# Patient Record
Sex: FEMALE | NOT HISPANIC OR LATINO | URBAN - METROPOLITAN AREA
[De-identification: names, ages, dates, MRNs, and addresses within clinical notes are randomized per-mention and may not be internally consistent; named-entity substitution may affect disease eponyms.]

---

## 2017-03-20 ENCOUNTER — IMPORTED ENCOUNTER (OUTPATIENT)
Dept: URBAN - METROPOLITAN AREA CLINIC 1 | Facility: CLINIC | Age: 67
End: 2017-03-20

## 2017-03-20 PROBLEM — H02.831: Noted: 2017-03-20

## 2017-03-20 PROBLEM — H02.834: Noted: 2017-03-20

## 2017-03-20 PROBLEM — H16.143: Noted: 2017-03-20

## 2017-03-20 PROBLEM — Z79.84: Noted: 2017-03-20

## 2017-03-20 PROBLEM — H25.813: Noted: 2017-03-20

## 2017-03-20 PROBLEM — D23.11: Noted: 2017-03-20

## 2017-03-20 PROBLEM — E11.9: Noted: 2017-03-20

## 2017-03-20 PROBLEM — H35.033: Noted: 2017-03-20

## 2017-03-20 PROBLEM — H04.123: Noted: 2017-03-20

## 2017-03-20 PROCEDURE — 92014 COMPRE OPH EXAM EST PT 1/>: CPT

## 2017-03-20 PROCEDURE — 92015 DETERMINE REFRACTIVE STATE: CPT

## 2017-03-20 NOTE — PATIENT DISCUSSION
1.  DM Type II (Oral Meds) without sign of diabetic retinopathy and no blot heme on dilated retinal examination today OU No Macular Edema:  Discussed the pathophysiology of diabetes and its effect on the eye and risk of blindness. Stressed the importance of strong glucose control. Advised of importance of at least yearly dilated examinations but to contact us immediately for any problems or concerns. 2. MARITA w/ increased PEK OU- Increase ATs to TID OU Routinely. Plugs w/o improvement. 3.  Cataract OU:  Visually Significant secondary to glare discussed the risks benefits alternatives and limitations of cataract surgery. The patient stated a full understanding and a desire to proceed with the procedure. The patient will need to return for preop appointment with cataract measurements and to have any additional questions answered and start pre-operative eye drops as directed. Not MF Candidate due to amount of astigmatism. Discuss Toric vs. Standard. Cat Pack gvien Phaco PCL OS then OD. (Otherwise f/u 6 months 10 glare) 4. Dermatochalasis OU UL's  - Observe 5. Hypertensive Retinopathy OU- Stable continue HTN Control6. Papilloma RUL- appears benign. Discussed with patient insurance would not cover procedure. 7.  Cyst R Lateral Canthus- appears benign. Letter to PCP Return for an appointment with Dr. Pamella Mai for AS/HP.

## 2017-03-31 ENCOUNTER — IMPORTED ENCOUNTER (OUTPATIENT)
Dept: URBAN - METROPOLITAN AREA CLINIC 1 | Facility: CLINIC | Age: 67
End: 2017-03-31

## 2017-03-31 PROBLEM — H25.813: Noted: 2017-03-31

## 2017-03-31 PROCEDURE — 92136 OPHTHALMIC BIOMETRY: CPT

## 2017-03-31 NOTE — PATIENT DISCUSSION
1. Cataract OU:  Visually Significant secondary to glare discussed the risks benefits alternatives and limitations of cataract surgery. The patient stated a full understanding and a desire to proceed with the procedure. Advised patient to continue po ASA; also to skip PM dose and AM dose of Metformin the day of and night before surgery. Pt understands they will need glasses post-op to achieve their best vision at near. Educated patient regarding option of LenSx upgrade; Video shown regarding LenSx. Pt defers LenSx. Phaco PCL OS then OD. Toric lens standard technique.

## 2017-04-12 ENCOUNTER — IMPORTED ENCOUNTER (OUTPATIENT)
Dept: URBAN - METROPOLITAN AREA CLINIC 1 | Facility: CLINIC | Age: 67
End: 2017-04-12

## 2017-04-13 ENCOUNTER — IMPORTED ENCOUNTER (OUTPATIENT)
Dept: URBAN - METROPOLITAN AREA CLINIC 1 | Facility: CLINIC | Age: 67
End: 2017-04-13

## 2017-04-13 PROBLEM — Z96.1: Noted: 2017-04-13

## 2017-04-13 PROCEDURE — 99024 POSTOP FOLLOW-UP VISIT: CPT

## 2017-04-13 NOTE — PATIENT DISCUSSION
POD#1 CE/IOL OS (Toric) doing well. Continue all 3 gtts as prescribed and until gone. Ocuflox TIDUse Durezol BID OS Ilevro Qdaily OS Ocuflox TID OS Post op Warnings Reiterated RTC as scheduled

## 2017-04-18 ENCOUNTER — IMPORTED ENCOUNTER (OUTPATIENT)
Dept: URBAN - METROPOLITAN AREA CLINIC 1 | Facility: CLINIC | Age: 67
End: 2017-04-18

## 2017-04-18 PROBLEM — Z96.1: Noted: 2017-04-18

## 2017-04-18 PROBLEM — H25.811: Noted: 2017-04-18

## 2017-04-18 PROCEDURE — 92136 OPHTHALMIC BIOMETRY: CPT

## 2017-04-18 NOTE — PATIENT DISCUSSION
1.  Cataract OD: Visually Significant secondary to glare discussed the risks benefits alternatives and limitations of cataract surgery. The patient stated a full understanding and a desire to proceed with the procedure. The patient will need to start pre-operative eye drops as directed. Proceed w/ phaco PCL OD2. POW#1  CE/IOL Toric OS doing well. Discontinue OcufloxContinue Lotemax/Durezol/Prednisolone BID until gone. Continue Prolensa/Ilevro/Acular QD until gone. 3. Return for an appointment for sx Od as scheduled with Dr. Clarence Tapia.

## 2017-04-26 ENCOUNTER — IMPORTED ENCOUNTER (OUTPATIENT)
Dept: URBAN - METROPOLITAN AREA CLINIC 1 | Facility: CLINIC | Age: 67
End: 2017-04-26

## 2017-04-27 ENCOUNTER — IMPORTED ENCOUNTER (OUTPATIENT)
Dept: URBAN - METROPOLITAN AREA CLINIC 1 | Facility: CLINIC | Age: 67
End: 2017-04-27

## 2017-04-27 PROBLEM — Z96.1: Noted: 2017-04-27

## 2017-04-27 PROCEDURE — 99024 POSTOP FOLLOW-UP VISIT: CPT

## 2017-04-27 NOTE — PATIENT DISCUSSION
1. POD#1 CE/IOL Toric OD doing well. Continue all 3 gtts as prescribed and until gone. Ocuflox TIDDurezol BIDIlevro QDPost op Warnings Reiterated 2. POW#2  CE/IOL Toric OS doing well. Continue Durezol BID until gone. Continue Ilevro QD until gone. 3. Return for an appointment for [address] as scheduled with Dr. Roma Ahuja.

## 2017-05-18 ENCOUNTER — IMPORTED ENCOUNTER (OUTPATIENT)
Dept: URBAN - METROPOLITAN AREA CLINIC 1 | Facility: CLINIC | Age: 67
End: 2017-05-18

## 2017-05-18 PROBLEM — Z96.1: Noted: 2017-05-18

## 2017-05-18 PROCEDURE — 99024 POSTOP FOLLOW-UP VISIT: CPT

## 2017-05-18 NOTE — PATIENT DISCUSSION
1. POW#3 Phaco/ PCL OU (Toric OU) doing well Finish PO meds per schedule MRX for glasses optional  2. Benign Floater OS- RD precautions. Return for an appointment in Feb 30 with Dr. Marley Lam.

## 2018-06-25 ENCOUNTER — IMPORTED ENCOUNTER (OUTPATIENT)
Dept: URBAN - METROPOLITAN AREA CLINIC 1 | Facility: CLINIC | Age: 68
End: 2018-06-25

## 2018-06-25 PROBLEM — Z96.1: Noted: 2018-06-25

## 2018-06-25 PROBLEM — H02.831: Noted: 2018-06-25

## 2018-06-25 PROBLEM — Z79.84: Noted: 2018-06-25

## 2018-06-25 PROBLEM — H16.143: Noted: 2018-06-25

## 2018-06-25 PROBLEM — H43.812: Noted: 2018-06-25

## 2018-06-25 PROBLEM — H02.834: Noted: 2018-06-25

## 2018-06-25 PROBLEM — E11.9: Noted: 2018-06-25

## 2018-06-25 PROBLEM — H35.033: Noted: 2018-06-25

## 2018-06-25 PROBLEM — H04.123: Noted: 2018-06-25

## 2018-06-25 PROCEDURE — 92014 COMPRE OPH EXAM EST PT 1/>: CPT

## 2018-06-25 NOTE — PATIENT DISCUSSION
1.  DM Type II (Oral Medication) without sign of diabetic retinopathy and no blot heme on dilated retinal examination today OU No Macular Edema:  Discussed the pathophysiology of diabetes and its effect on the eye and risk of blindness. Stressed the importance of strong glucose control. Advised of importance of at least yearly dilated examinations but to contact us immediately for any problems or concerns. 2. MARITA w/ PEK OU- Recommend cont ATs TID OU routinely 3. GR I Hypertensive Retinopathy OU- Stable continue HTN Control4. Pseudophakia OU - (Toric OU) 5.  Dermatochalasis OU UL's  - Follow with no intervention at this time. 6. PVD w/o Tear OS- RD precautions. 7.  Papilloma RUL- appears benign. Discussed with patient insurance would not cover procedure. 8.  Cyst R Lateral Canthus- appears benign. MRX for glasses givenReturn for an appointment in 1 year 30/glare with Dr. Clarence Tapia.

## 2019-06-04 ENCOUNTER — IMPORTED ENCOUNTER (OUTPATIENT)
Dept: URBAN - METROPOLITAN AREA CLINIC 1 | Facility: CLINIC | Age: 69
End: 2019-06-04

## 2019-06-04 PROBLEM — Z96.1: Noted: 2019-06-04

## 2019-06-04 PROBLEM — H04.123: Noted: 2019-06-04

## 2019-06-04 PROBLEM — Z79.84: Noted: 2019-06-04

## 2019-06-04 PROBLEM — H16.143: Noted: 2019-06-04

## 2019-06-04 PROBLEM — H26.493: Noted: 2019-06-04

## 2019-06-04 PROBLEM — H02.834: Noted: 2019-06-04

## 2019-06-04 PROBLEM — H35.033: Noted: 2019-06-04

## 2019-06-04 PROBLEM — H43.812: Noted: 2019-06-04

## 2019-06-04 PROBLEM — E11.9: Noted: 2019-06-04

## 2019-06-04 PROBLEM — H02.831: Noted: 2019-06-04

## 2019-06-04 PROCEDURE — 92014 COMPRE OPH EXAM EST PT 1/>: CPT

## 2019-06-04 NOTE — PATIENT DISCUSSION
1.  DM Type II (Oral Medication) without sign of diabetic retinopathy and no blot heme on dilated retinal examination today OU No Macular Edema: (32 years). Discussed the pathophysiology of diabetes and its effect on the eye and risk of blindness. Stressed the importance of strong glucose control. Advised of importance of at least yearly dilated examinations but to contact us immediately for any problems or concerns. 2. MARITA w/ PEK OU- Recommend ATs TID OU routinely  3. PCO OU: (Posterior Capsule Opacification)   Observe and consider yag cap when pt feels pco visually significant and visual acuity decreases to appropriate level. 4. Pseudophakia OU - (Toric OU) 5.  Dermatochalasis OU UL's  - Follow with no intervention at this time. 6. GR I Hypertensive Retinopathy OU- Stable continue HTN Control7. PVD w/o Tear OS- RD precautions. 8.  Papilloma RUL- appears benign. Discussed with patient insurance would not cover procedure. 9.  Cyst R Lateral Canthus- appears benign. Patient deferred Manifest Rx today. Return for an appointment in 1 year 30/glare with Dr. Marley Lam.

## 2020-06-04 ENCOUNTER — IMPORTED ENCOUNTER (OUTPATIENT)
Dept: URBAN - METROPOLITAN AREA CLINIC 1 | Facility: CLINIC | Age: 70
End: 2020-06-04

## 2020-06-04 PROBLEM — H04.123: Noted: 2020-06-04

## 2020-06-04 PROBLEM — H02.834: Noted: 2020-06-04

## 2020-06-04 PROBLEM — H16.143: Noted: 2020-06-04

## 2020-06-04 PROBLEM — H02.831: Noted: 2020-06-04

## 2020-06-04 PROBLEM — E11.9: Noted: 2020-06-04

## 2020-06-04 PROBLEM — H35.033: Noted: 2020-06-04

## 2020-06-04 PROBLEM — Z79.84: Noted: 2020-06-04

## 2020-06-04 PROBLEM — H26.493: Noted: 2020-06-04

## 2020-06-04 PROBLEM — H43.812: Noted: 2020-06-04

## 2020-06-04 PROBLEM — Z96.1: Noted: 2020-06-04

## 2020-06-04 PROCEDURE — 92014 COMPRE OPH EXAM EST PT 1/>: CPT

## 2020-06-04 NOTE — PATIENT DISCUSSION
1.  DM Type II (Oral Medication) without sign of diabetic retinopathy and no blot heme on dilated retinal examination today OU No Macular Edema:  Discussed the pathophysiology of diabetes and its effect on the eye and risk of blindness. Stressed the importance of strong glucose control. Advised of importance of at least yearly dilated examinations but to contact us immediately for any problems or concerns. 2. MARITA w/ PEK OU- Recommend ATs QID OU routinely and Refresh Celluvisc QHS OU. Consider Restasis vs Xiidra without improvement3. PCO OU: (Posterior Capsule Opacification)   Observe and consider yag cap when pt feels pco visually significant and visual acuity decreases to appropriate level. 4. Pseudophakia OU - (Toric OU) 5.  Dermatochalasis OU UL's  - Follow with no intervention at this time. 6. GR I Hypertensive Retinopathy OU- Stable continue HTN Control7. PVD w/o Tear OS- RD precautions. 8.  Papilloma RUL- appears benign. Discussed with patient insurance would not cover procedure. 9.  Cyst R Lateral Canthus- appears benign. Patient deferred Manifest Rx today. Return for an appointment in 4 months 10 (check ks consider Restasis vs Kyleigh Ackerman) with Dr. Lu Russ.

## 2020-10-06 ENCOUNTER — IMPORTED ENCOUNTER (OUTPATIENT)
Dept: URBAN - METROPOLITAN AREA CLINIC 1 | Facility: CLINIC | Age: 70
End: 2020-10-06

## 2020-10-06 PROBLEM — H16.143: Noted: 2020-10-06

## 2020-10-06 PROBLEM — H04.123: Noted: 2020-10-06

## 2020-10-06 PROCEDURE — 99213 OFFICE O/P EST LOW 20 MIN: CPT

## 2020-10-06 NOTE — PATIENT DISCUSSION
PCO OU: (Posterior Capsule Opacification)   Observe and consider yag cap when pt feels pco visually significant and visual acuity decreases to appropriate level. 3. Pseudophakia OU - (Toric OU) 4.  Dermatochalasis OU UL's  - Follow with no intervention at this time. 5. Papilloma RUL- appears benign. Discussed with patient insurance would not cover procedure. 6.  Cyst R Lateral Canthus- appears benign. 7. H/o DM w/o DR OU 8. H/o GR I Hypertensive Retinopathy OU 9.   H/o PVD w/o Tear OS

## 2020-10-06 NOTE — PATIENT DISCUSSION
1.  MARITA w/ PEK OU- Improved. Recommend ATs QID OU routinely and AT [place] QHS OU 2. PCO OU: (Posterior Capsule Opacification)   Observe and consider yag cap when pt feels pco visually significant and visual acuity decreases to appropriate level. 3. Pseudophakia OU - (Toric OU) 4.  Dermatochalasis OU UL's  - Follow with no intervention at this time. 5. Papilloma RUL- appears benign. Discussed with patient insurance would not cover procedure. 6.  Cyst R Lateral Canthus- appears benign. 7. H/o DM w/o DR OU 8. H/o GR I Hypertensive Retinopathy OU 9. H/o PVD w/o Tear OSReturn for an appointment in June 30/glare with Dr. Tammi Moreno.

## 2021-06-08 ENCOUNTER — IMPORTED ENCOUNTER (OUTPATIENT)
Dept: URBAN - METROPOLITAN AREA CLINIC 1 | Facility: CLINIC | Age: 71
End: 2021-06-08

## 2021-06-08 PROBLEM — H04.123: Noted: 2021-06-08

## 2021-06-08 PROBLEM — H16.143: Noted: 2021-06-08

## 2021-06-08 PROBLEM — E11.9: Noted: 2021-06-08

## 2021-06-08 PROBLEM — H26.493: Noted: 2021-06-08

## 2021-06-08 PROBLEM — Z79.84: Noted: 2021-06-08

## 2021-06-08 PROCEDURE — 99214 OFFICE O/P EST MOD 30 MIN: CPT

## 2021-06-08 NOTE — PATIENT DISCUSSION
1.  DM Type II (Oral Med) without sign of diabetic retinopathy and no blot heme on dilated retinal examination today OU No Macular Edema:  Discussed the pathophysiology of diabetes and its effect on the eye and risk of blindness. Stressed the importance of strong glucose control. Advised of importance of at least yearly dilated examinations but to contact us immediately for any problems or concerns. 2. MARITA w/ PEK OU -- Compliance w/ ATs recommended. Increase ATs QID OU routinely and Refresh Celluvisc QHS OU. Consider Restasis vs Xiidra without improvement3. PCO OU: (Posterior Capsule Opacification)   Observe and consider yag cap when pt feels pco visually significant and visual acuity decreases to appropriate level. 4. Pseudophakia OU - (Toric OU) 5.  Dermatochalasis OU UL's  - Follow with no intervention at this time. 6. GR I Hypertensive Retinopathy OU- Stable continue HTN Control7. PVD w/o Tear OS- RD precautions. 8.  Papilloma RUL- appears benign. Discussed with patient insurance would not cover procedure. 9.  Cyst R Lateral Canthus- appears benign. Patient defers the refraction at today's visitLetter to PCP. Return for an appointment in 1 year for a 30/glare with Dr. Alfonso Elkins.

## 2022-04-02 ASSESSMENT — VISUAL ACUITY
OD_CC: 20/25
OD_CC: 20/25-2
OS_GLARE: 20/50
OS_GLARE: 20/80
OS_GLARE: 20/50
OS_CC: 20/30-2
OD_CC: 20/25
OS_SC: 20/30
OD_GLARE: 20/50
OS_CC: 20/30
OD_GLARE: 20/80
OD_CC: 20/20
OD_GLARE: 20/40
OD_CC: 20/25
OS_GLARE: 20/40
OS_CC: 20/25-1
OS_CC: 20/30
OD_GLARE: 20/80
OD_SC: 20/30
OD_SC: 20/30
OS_SC: 20/30
OS_CC: 20/25-1
OS_GLARE: 20/80
OS_CC: 20/30+1
OS_CC: 20/25-1
OS_CC: 20/30
OD_GLARE: 20/40
OD_CC: 20/30+1
OD_CC: 20/25-1

## 2022-04-02 ASSESSMENT — TONOMETRY
OS_IOP_MMHG: 18
OS_IOP_MMHG: 18
OS_IOP_MMHG: 17
OS_IOP_MMHG: 18
OS_IOP_MMHG: 18
OS_IOP_MMHG: 19
OD_IOP_MMHG: 16
OS_IOP_MMHG: 19
OD_IOP_MMHG: 17
OD_IOP_MMHG: 18
OS_IOP_MMHG: 17
OD_IOP_MMHG: 18
OD_IOP_MMHG: 19
OS_IOP_MMHG: 18
OD_IOP_MMHG: 19
OD_IOP_MMHG: 18
OD_IOP_MMHG: 16

## 2022-08-04 ENCOUNTER — COMPREHENSIVE EXAM (OUTPATIENT)
Dept: URBAN - METROPOLITAN AREA CLINIC 1 | Facility: CLINIC | Age: 72
End: 2022-08-04

## 2022-08-04 DIAGNOSIS — H02.834: ICD-10-CM

## 2022-08-04 DIAGNOSIS — Z96.1: ICD-10-CM

## 2022-08-04 DIAGNOSIS — H35.033: ICD-10-CM

## 2022-08-04 DIAGNOSIS — E11.9: ICD-10-CM

## 2022-08-04 DIAGNOSIS — H43.812: ICD-10-CM

## 2022-08-04 DIAGNOSIS — H02.831: ICD-10-CM

## 2022-08-04 DIAGNOSIS — H16.143: ICD-10-CM

## 2022-08-04 DIAGNOSIS — H04.123: ICD-10-CM

## 2022-08-04 DIAGNOSIS — H26.493: ICD-10-CM

## 2022-08-04 PROCEDURE — 99214 OFFICE O/P EST MOD 30 MIN: CPT

## 2022-08-04 PROCEDURE — 92015 DETERMINE REFRACTIVE STATE: CPT

## 2022-08-04 RX ORDER — LIFITEGRAST 50 MG/ML: 1 SOLUTION/ DROPS OPHTHALMIC TWICE A DAY

## 2022-08-04 ASSESSMENT — VISUAL ACUITY
OS_SC: 20/25
OD_CC: J1
OS_CC: J1
OD_SC: 20/25-2

## 2022-08-04 ASSESSMENT — TONOMETRY
OD_IOP_MMHG: 17
OS_IOP_MMHG: 17

## 2022-08-04 NOTE — PATIENT DISCUSSION
Advised the patient to switch to PF ATs and use TID OU. Discussed with the patient the risks and benefits of adding a prescription dry eye drop( Restasis/Xiidra/Cequa). Patient agrees, will have the patient start on Xiidra BID OU (Erx'd to patient's pharmacy).

## 2022-10-04 ENCOUNTER — FOLLOW UP (OUTPATIENT)
Dept: URBAN - METROPOLITAN AREA CLINIC 1 | Facility: CLINIC | Age: 72
End: 2022-10-04

## 2022-10-04 DIAGNOSIS — H04.123: ICD-10-CM

## 2022-10-04 PROCEDURE — 99213 OFFICE O/P EST LOW 20 MIN: CPT

## 2022-10-04 ASSESSMENT — TONOMETRY
OD_IOP_MMHG: 15
OS_IOP_MMHG: 15

## 2022-10-04 ASSESSMENT — VISUAL ACUITY
OD_SC: 20/25 -1
OS_SC: 20/25

## 2023-04-07 ENCOUNTER — FOLLOW UP (OUTPATIENT)
Dept: URBAN - METROPOLITAN AREA CLINIC 1 | Facility: CLINIC | Age: 73
End: 2023-04-07

## 2023-04-07 DIAGNOSIS — H04.123: ICD-10-CM

## 2023-04-07 DIAGNOSIS — H26.493: ICD-10-CM

## 2023-04-07 DIAGNOSIS — H16.143: ICD-10-CM

## 2023-04-07 PROCEDURE — 99213 OFFICE O/P EST LOW 20 MIN: CPT

## 2023-04-07 ASSESSMENT — VISUAL ACUITY
OS_SC: 20/25-1
OD_SC: 20/25-2
OD_CC: J1
OS_CC: J1

## 2023-04-07 ASSESSMENT — TONOMETRY
OS_IOP_MMHG: 15
OD_IOP_MMHG: 15

## 2023-10-13 ENCOUNTER — COMPREHENSIVE EXAM (OUTPATIENT)
Dept: URBAN - METROPOLITAN AREA CLINIC 1 | Facility: CLINIC | Age: 73
End: 2023-10-13

## 2023-10-13 DIAGNOSIS — H04.123: ICD-10-CM

## 2023-10-13 DIAGNOSIS — E11.9: ICD-10-CM

## 2023-10-13 DIAGNOSIS — H02.831: ICD-10-CM

## 2023-10-13 DIAGNOSIS — H43.813: ICD-10-CM

## 2023-10-13 DIAGNOSIS — Z96.1: ICD-10-CM

## 2023-10-13 DIAGNOSIS — H16.143: ICD-10-CM

## 2023-10-13 DIAGNOSIS — H26.493: ICD-10-CM

## 2023-10-13 DIAGNOSIS — H02.834: ICD-10-CM

## 2023-10-13 DIAGNOSIS — H35.033: ICD-10-CM

## 2023-10-13 PROCEDURE — 92015 DETERMINE REFRACTIVE STATE: CPT

## 2023-10-13 PROCEDURE — 99214 OFFICE O/P EST MOD 30 MIN: CPT

## 2023-10-13 RX ORDER — CYCLOSPORINE 0.5 MG/ML: 1 EMULSION OPHTHALMIC TWICE A DAY

## 2023-10-13 ASSESSMENT — TONOMETRY
OD_IOP_MMHG: 14
OS_IOP_MMHG: 14

## 2023-10-13 ASSESSMENT — VISUAL ACUITY
OD_SC: 20/20
OS_BAT: 20/60
OS_SC: 20/20
OD_BAT: 20/60

## 2023-11-17 ENCOUNTER — CLINIC PROCEDURE ONLY (OUTPATIENT)
Dept: URBAN - METROPOLITAN AREA CLINIC 1 | Facility: CLINIC | Age: 73
End: 2023-11-17

## 2023-11-17 DIAGNOSIS — H26.493: ICD-10-CM

## 2023-11-17 DIAGNOSIS — Z96.1: ICD-10-CM

## 2023-11-17 PROCEDURE — 66821 AFTER CATARACT LASER SURGERY: CPT

## 2023-12-01 ENCOUNTER — CLINIC PROCEDURE ONLY (OUTPATIENT)
Dept: URBAN - METROPOLITAN AREA CLINIC 1 | Facility: CLINIC | Age: 73
End: 2023-12-01

## 2023-12-01 DIAGNOSIS — H26.492: ICD-10-CM

## 2023-12-01 DIAGNOSIS — Z96.1: ICD-10-CM

## 2023-12-01 PROCEDURE — 66821 AFTER CATARACT LASER SURGERY: CPT

## 2023-12-18 ENCOUNTER — POST-OP (OUTPATIENT)
Dept: URBAN - METROPOLITAN AREA CLINIC 1 | Facility: CLINIC | Age: 73
End: 2023-12-18

## 2023-12-18 DIAGNOSIS — Z98.890: ICD-10-CM

## 2023-12-18 PROCEDURE — 99024 POSTOP FOLLOW-UP VISIT: CPT

## 2023-12-18 ASSESSMENT — TONOMETRY
OS_IOP_MMHG: 14
OD_IOP_MMHG: 14

## 2023-12-18 ASSESSMENT — VISUAL ACUITY
OD_SC: 20/20
OS_SC: 20/25

## 2024-11-22 ENCOUNTER — COMPREHENSIVE EXAM (OUTPATIENT)
Dept: URBAN - METROPOLITAN AREA CLINIC 1 | Facility: CLINIC | Age: 74
End: 2024-11-22

## 2024-11-22 DIAGNOSIS — H16.143: ICD-10-CM

## 2024-11-22 DIAGNOSIS — H35.033: ICD-10-CM

## 2024-11-22 DIAGNOSIS — H04.123: ICD-10-CM

## 2024-11-22 DIAGNOSIS — E11.9: ICD-10-CM

## 2024-11-22 PROCEDURE — 99214 OFFICE O/P EST MOD 30 MIN: CPT
